# Patient Record
Sex: MALE | Race: WHITE
[De-identification: names, ages, dates, MRNs, and addresses within clinical notes are randomized per-mention and may not be internally consistent; named-entity substitution may affect disease eponyms.]

---

## 2018-01-21 ENCOUNTER — HOSPITAL ENCOUNTER (EMERGENCY)
Dept: HOSPITAL 45 - C.EDA | Age: 53
Discharge: PSYCHIATRIC OR SUBSTANCE USE FACILITY | End: 2018-01-21
Payer: COMMERCIAL

## 2018-01-21 VITALS — TEMPERATURE: 99.32 F

## 2018-01-21 VITALS
BODY MASS INDEX: 24.33 KG/M2 | HEIGHT: 74.02 IN | HEIGHT: 74.02 IN | BODY MASS INDEX: 24.33 KG/M2 | WEIGHT: 189.6 LBS | WEIGHT: 189.6 LBS

## 2018-01-21 VITALS — OXYGEN SATURATION: 95 % | DIASTOLIC BLOOD PRESSURE: 73 MMHG | HEART RATE: 105 BPM | SYSTOLIC BLOOD PRESSURE: 117 MMHG

## 2018-01-21 DIAGNOSIS — F11.90: ICD-10-CM

## 2018-01-21 DIAGNOSIS — F23: Primary | ICD-10-CM

## 2018-01-21 DIAGNOSIS — R80.9: ICD-10-CM

## 2018-01-21 DIAGNOSIS — Z72.820: ICD-10-CM

## 2018-01-21 LAB
ALBUMIN SERPL-MCNC: 3.6 GM/DL (ref 3.4–5)
ALP SERPL-CCNC: 102 U/L (ref 45–117)
ALT SERPL-CCNC: 34 U/L (ref 12–78)
AST SERPL-CCNC: 17 U/L (ref 15–37)
BUN SERPL-MCNC: 27 MG/DL (ref 7–18)
CALCIUM SERPL-MCNC: 8.9 MG/DL (ref 8.5–10.1)
CO2 SERPL-SCNC: 29 MMOL/L (ref 21–32)
CREAT SERPL-MCNC: 1.16 MG/DL (ref 0.6–1.4)
EOSINOPHIL NFR BLD AUTO: 303 K/UL (ref 130–400)
GLUCOSE SERPL-MCNC: 126 MG/DL (ref 70–99)
HCT VFR BLD CALC: 44.8 % (ref 42–52)
HGB BLD-MCNC: 15.2 G/DL (ref 14–18)
MCH RBC QN AUTO: 31.4 PG (ref 25–34)
MCHC RBC AUTO-ENTMCNC: 33.9 G/DL (ref 32–36)
MCV RBC AUTO: 92.6 FL (ref 80–100)
PMV BLD AUTO: 10.4 FL (ref 7.4–10.4)
POTASSIUM SERPL-SCNC: 4.1 MMOL/L (ref 3.5–5.1)
PROT SERPL-MCNC: 6.7 GM/DL (ref 6.4–8.2)
RED CELL DISTRIBUTION WIDTH CV: 13.2 % (ref 11.5–14.5)
RED CELL DISTRIBUTION WIDTH SD: 44.8 FL (ref 36.4–46.3)
SODIUM SERPL-SCNC: 136 MMOL/L (ref 136–145)
WBC # BLD AUTO: 14.49 K/UL (ref 4.8–10.8)
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
## 2018-01-21 NOTE — DIAGNOSTIC IMAGING REPORT
HEAD WITHOUT CONTRAST (CT)



CLINICAL HISTORY: 52 years-old Male with acute paranoia.  Acute mental health

evaluation  



TECHNIQUE: Multiple axial CT images of the head were obtained without contrast. 

A dose lowering technique was utilized adhering to the principles of ALARA.



CT DOSE:  614.27 mGy.cm



COMPARISON: None.



FINDINGS: 



No acute intracranial hemorrhage, midline shift, intracranial mass,

hydrocephalus, territorial ischemia or abnormal extra-axial collection.



The calvarium is intact.  The paranasal sinuses, mastoid air cells, and middle

ear cavities are clear.



IMPRESSION:  No acute intracranial abnormality.







The above report was generated using voice recognition software. It may contain

grammatical, syntax or spelling errors.







Electronically signed by:  Roni Zarco M.D.

1/21/2018 6:38 AM



Dictated Date/Time:  1/21/2018 6:37 AM